# Patient Record
Sex: MALE | Race: BLACK OR AFRICAN AMERICAN | Employment: UNEMPLOYED | URBAN - METROPOLITAN AREA
[De-identification: names, ages, dates, MRNs, and addresses within clinical notes are randomized per-mention and may not be internally consistent; named-entity substitution may affect disease eponyms.]

---

## 2018-06-21 ENCOUNTER — HOSPITAL ENCOUNTER (EMERGENCY)
Age: 43
Discharge: HOME | End: 2018-06-21
Attending: EMERGENCY MEDICINE
Payer: SELF-PAY

## 2018-06-21 ENCOUNTER — APPOINTMENT (OUTPATIENT)
Dept: GENERAL RADIOLOGY | Age: 43
End: 2018-06-21
Attending: PHYSICIAN ASSISTANT
Payer: SELF-PAY

## 2018-06-21 VITALS
RESPIRATION RATE: 17 BRPM | BODY MASS INDEX: 31.99 KG/M2 | OXYGEN SATURATION: 98 % | DIASTOLIC BLOOD PRESSURE: 95 MMHG | SYSTOLIC BLOOD PRESSURE: 153 MMHG | HEART RATE: 87 BPM | WEIGHT: 216 LBS | TEMPERATURE: 98.1 F | HEIGHT: 69 IN

## 2018-06-21 DIAGNOSIS — S90.852A: Primary | ICD-10-CM

## 2018-06-21 PROCEDURE — 75810000292 HC REMOVE FB FOOT

## 2018-06-21 PROCEDURE — 99282 EMERGENCY DEPT VISIT SF MDM: CPT

## 2018-06-21 PROCEDURE — 73630 X-RAY EXAM OF FOOT: CPT

## 2018-06-21 RX ORDER — CEPHALEXIN 500 MG/1
500 CAPSULE ORAL 4 TIMES DAILY
Qty: 28 CAP | Refills: 0 | Status: SHIPPED | OUTPATIENT
Start: 2018-06-21 | End: 2018-06-28

## 2018-06-21 RX ORDER — IBUPROFEN 800 MG/1
800 TABLET ORAL
Qty: 20 TAB | Refills: 0 | Status: SHIPPED | OUTPATIENT
Start: 2018-06-21 | End: 2018-06-28

## 2018-06-22 NOTE — ED NOTES
Pt arrived to ED ambulatory with c/o something stuck in the bottom of his L foot. Pt states he was vacuuming 2 days ago when he stepped on something. Small lac noted on bottom of left foot. No bleeding/scabs. Pt states tetanus shot is UTD. Pt is alert and orientated X 4; skin is intact; lungs are clear; pt breathes well on room air; Pt is in no acute distress. Will continue to monitor. See nursing assessment. Safety precautions in place; call light within reach. Emergency Department Nursing Plan of Care       The Nursing Plan of Care is developed from the Nursing assessment and Emergency Department Attending provider initial evaluation. The plan of care may be reviewed in the ED Provider note.     The Plan of Care was developed with the following considerations:   Patient / Family readiness to learn indicated by:verbalized understanding  Persons(s) to be included in education: patient  Barriers to Learning/Limitations:Tierra Schwarz, RN    6/21/2018   8:22 PM

## 2018-06-22 NOTE — DISCHARGE INSTRUCTIONS
Object in the Skin: Care Instructions  Your Care Instructions  Small objects (splinters) of wood, metal, glass, or plastic can become embedded in the skin. Thorns from [employer] and other plants also can prick or become stuck in the skin. Splinters can cause an infection if they are not removed. Your doctor probably removed the object and cleaned the skin well. Your doctor may have given you antibiotics to prevent infection and a tetanus shot if you had not had one in the last 5 years or do not know when you had your last one. For a few days, you may have pain and itching in the wound where the object was removed. Follow-up care is a key part of your treatment and safety. Be sure to make and go to all appointments, and call your doctor if you are having problems. It's also a good idea to know your test results and keep a list of the medicines you take. How can you care for yourself at home? · If your doctor told you how to care for your wound, follow your doctor's instructions. If you did not get instructions, follow this general advice:  ¨ Wash the wound with clean water 2 times a day. Don't use hydrogen peroxide or alcohol, which can slow healing. ¨ You may cover the wound with a thin layer of petroleum jelly, such as Vaseline, and a nonstick bandage. ¨ Apply more petroleum jelly and replace the bandage as needed. · Your doctor may have used medicine to numb your skin. When it wears off, your pain may return. Take an over-the-counter pain medicine, such as acetaminophen (Tylenol), ibuprofen (Advil, Motrin), or naproxen (Aleve). Read and follow all instructions on the label. · Do not take two or more pain medicines at the same time unless the doctor told you to. Many pain medicines have acetaminophen, which is Tylenol. Too much acetaminophen (Tylenol) can be harmful. · If your doctor prescribed antibiotics, take them as directed. Do not stop taking them just because you feel better.  You need to take the full course of antibiotics. · After 2 or 3 days, if your swelling is gone, apply a heating pad set on low or a warm cloth to your wound area. Some doctors suggest that you go back and forth between hot and cold. Put a thin cloth between the heating pad and your skin. · It may help to prop up the affected part of your body on a pillow anytime you sit or lie down during the next 3 days. Try to keep it above the level of your heart. This will help reduce swelling. · Your wound may itch or feel irritated. A little redness and swelling is normal. Do not scratch or rub the wound. When should you call for help? Call your doctor now or seek immediate medical care if:  ? · The skin near the wound is cool or pale or changes color. ? · You have tingling, weakness, or numbness in the area near the wound. ? · The wound starts to bleed, and blood soaks the bandage. Oozing small amounts of blood is normal.   ? · You have trouble moving a limb near the wound. ? · You have signs of infection, such as:  ¨ Increased pain, swelling, warmth, or redness. ¨ Red streaks leading from the wound. ¨ Pus draining from the wound. ¨ A fever. ? Watch closely for changes in your health, and be sure to contact your doctor if:  ? · You do not get better as expected. Where can you learn more? Go to http://milagro-charissa.info/. Enter Marilin Murphy in the search box to learn more about \"Object in the Skin: Care Instructions. \"  Current as of: March 20, 2017  Content Version: 11.4  © 0115-0610 [employer]. Care instructions adapted under license by [employer] (which disclaims liability or warranty for this information). If you have questions about a medical condition or this instruction, always ask your healthcare professional. [address] any warranty or liability for your use of this information.

## 2018-06-22 NOTE — ED PROVIDER NOTES
EMERGENCY DEPARTMENT HISTORY AND PHYSICAL EXAM    Date: 6/21/2018  Patient Name: Tonya Cherry    History of Presenting Illness     Chief Complaint   Patient presents with    Foot Injury     L X 2 days w/ swelling, pt states he stepped on something but is unsure if the object is still in his foot         History Provided By: Patient    HPI: Tonya Cherry is a 40 y.o. male with No significant past medical history who presents with concern for FB to the L foot. Pt states yesterday he was vacuuming the house barefoot and stepped on something. Pt states he just wants to make sure nothing is left in there. Pt reports pain 6/10 and sore. Pain with ambulation and pressure. Tetanus is uptd    PCP: None        Past History     Past Medical History:  History reviewed. No pertinent past medical history. Past Surgical History:  History reviewed. No pertinent surgical history. Family History:  History reviewed. No pertinent family history. Social History:  Social History   Substance Use Topics    Smoking status: Unknown If Ever Smoked    Smokeless tobacco: None    Alcohol use None       Allergies: Allergies   Allergen Reactions    Cat Dander Sneezing         Review of Systems   Review of Systems   Musculoskeletal: Positive for myalgias. Negative for joint swelling. Skin: Positive for wound. Neurological: Negative for speech difficulty and weakness. All other systems reviewed and are negative. Physical Exam     Vitals:    06/21/18 2013   BP: (!) 153/95   Pulse: 87   Resp: 17   Temp: 98.1 °F (36.7 °C)   SpO2: 98%   Weight: 98 kg (216 lb)   Height: 5' 9\" (1.753 m)     Physical Exam   Constitutional: He is oriented to person, place, and time. He appears well-developed and well-nourished. No distress. HENT:   Head: Normocephalic and atraumatic. Mouth/Throat: Oropharynx is clear and moist. No oropharyngeal exudate.    Eyes: Conjunctivae are normal.   Cardiovascular: Normal rate, regular rhythm and normal heart sounds. Pulmonary/Chest: Effort normal and breath sounds normal. No respiratory distress. He has no wheezes. He has no rales. Musculoskeletal: Normal range of motion. Left foot: There is normal range of motion. Lacerations: superficial approx 1cm laceration to the plantar aspect of L foot, no visible or palpable FB noted, no erythema, no swelling, no drainage and NTTP. Feet:    Neurological: He is alert and oriented to person, place, and time. Gait normal. GCS eye subscore is 4. GCS verbal subscore is 5. GCS motor subscore is 6. Skin: Skin is warm and dry. Psychiatric: He has a normal mood and affect. His behavior is normal. Judgment and thought content normal.   Nursing note and vitals reviewed. at 8:55 PM      Diagnostic Study Results     Labs -   No results found for this or any previous visit (from the past 12 hour(s)). Radiologic Studies -   XR FOOT LT MIN 3 V   Final Result        CT Results  (Last 48 hours)    None        CXR Results  (Last 48 hours)    None         Final result by Hector, Rad Results In (06/21/18 20:39:48)     Initial Result:     Impression:     IMPRESSION:    1. Soft tissue swelling over the distal foot. Possible radiopaque foreign body  at the level of the base of the 4th proximal phalanx.           Narrative:     EXAM:  XR FOOT LT MIN 3 V    INDICATION:   R/O FB. Additional history: Patient stepped on something and is unsure if the object  remains in the foot. COMPARISON:  None. FINDINGS:  Three views of the left foot demonstrate no visible fracture or  malalignment. Soft tissue swelling over the lateral aspect of the distal foot. On the oblique image, there is a radio opaque object measuring approximately 0.1  cm just lateral to the base of the 4th proximal phalanx which may be projecting  over the base of the 4th proximal phalanx on the frontal view.  .           Medical Decision Making   I am the first provider for this patient. I reviewed the vital signs, available nursing notes, past medical history, past surgical history, family history and social history. Vital Signs-Reviewed the patient's vital signs. Disposition:  Discharged    DISCHARGE NOTE:   8:55 PM      Care plan outlined and precautions discussed. Patient has no new complaints, changes, or physical findings. Results of xrays were reviewed with the patient. All medications were reviewed with the patient; will d/c home with abx. All of pt's questions and concerns were addressed. Patient was instructed and agrees to follow up with PCP prn, as well as to return to the ED upon further deterioration. Patient is ready to go home. Follow-up Information     Follow up With Details Comments [address] [facility],[address] [facility] Schedule an appointment as soon as possible for a visit in 1 week As needed [address]  [phone]          Current Discharge Medication List      START taking these medications    Details   cephALEXin (KEFLEX) 500 mg capsule Take 1 Cap by mouth four (4) times daily for 7 days. Qty: 28 Cap, Refills: 0      ibuprofen (MOTRIN) 800 mg tablet Take 1 Tab by mouth every six (6) hours as needed for Pain for up to 7 days. Qty: 20 Tab, Refills: 0             Provider Notes (Medical Decision Making):   DDX: FB, puncture wound, superficial laceration    Procedures:  Foreign Body Removal  Date/Time: 6/21/2018 8:52 PM  Performed by: Army Clancy  Authorized by: Army Clancy     Consent:     Consent obtained:  Verbal    Consent given by:  Patient    Risks discussed:  Pain  Location:     Location:  Foot    Foot location:  L sole    Depth:  Subcutaneous  Pre-procedure details:     Imaging:  X-ray    Neurovascular status: intact    Anesthesia (see MAR for exact dosages):      Anesthesia method:  None  Procedure type:     Procedure complexity:  Simple  Procedure details:     Scalpel size:  11 Incision length:  No incision made    Localization method:  Probed and visualized    Bloodless field: yes      Removal mechanism: Forceps (scissors and #11 blade)    Foreign bodies recovered:  1    Description:  Small piece of sharp glass    Intact foreign body removal: yes    Post-procedure details:     Confirmation:  No additional foreign bodies on visualization    Skin closure:  None    Dressing:  Adhesive bandage    Patient tolerance of procedure: Tolerated well, no immediate complications  Comments:      Small amount of bloody/purulent drainage expressed from wound once FB removed. Area cleaned with NS and wound dressed with bandaid            Diagnosis     Clinical Impression:   1.  Superficial foreign body of left foot, initial encounter

## 2018-06-22 NOTE — ED NOTES
